# Patient Record
Sex: MALE
[De-identification: names, ages, dates, MRNs, and addresses within clinical notes are randomized per-mention and may not be internally consistent; named-entity substitution may affect disease eponyms.]

---

## 2017-09-22 PROCEDURE — 84403 ASSAY OF TOTAL TESTOSTERONE: CPT | Performed by: INTERNAL MEDICINE

## 2017-10-16 PROCEDURE — 81003 URINALYSIS AUTO W/O SCOPE: CPT | Performed by: INTERNAL MEDICINE

## 2017-11-16 ENCOUNTER — HOSPITAL (OUTPATIENT)
Dept: OTHER | Age: 47
End: 2017-11-16
Attending: EMERGENCY MEDICINE

## 2017-11-16 PROBLEM — T39.391A NSAID OVERDOSE: Status: ACTIVE | Noted: 2017-11-16

## 2017-11-16 PROBLEM — R79.89 ELEVATED SERUM CREATININE: Status: ACTIVE | Noted: 2017-11-16

## 2017-11-16 PROBLEM — T50.905A: Status: ACTIVE | Noted: 2017-11-16

## 2017-11-16 PROBLEM — N18.2 CKD (CHRONIC KIDNEY DISEASE) STAGE 2, GFR 60-89 ML/MIN: Status: ACTIVE | Noted: 2017-11-16

## 2018-03-31 PROCEDURE — 84403 ASSAY OF TOTAL TESTOSTERONE: CPT | Performed by: INTERNAL MEDICINE

## 2018-03-31 PROCEDURE — 84402 ASSAY OF FREE TESTOSTERONE: CPT | Performed by: INTERNAL MEDICINE

## 2018-04-17 PROCEDURE — 82570 ASSAY OF URINE CREATININE: CPT | Performed by: INTERNAL MEDICINE

## 2018-04-17 PROCEDURE — 84156 ASSAY OF PROTEIN URINE: CPT | Performed by: INTERNAL MEDICINE

## 2018-04-17 PROCEDURE — 82610 CYSTATIN C: CPT | Performed by: INTERNAL MEDICINE

## 2018-08-20 PROBLEM — M77.11 RIGHT TENNIS ELBOW: Status: ACTIVE | Noted: 2018-08-20

## 2018-10-22 PROCEDURE — 82024 ASSAY OF ACTH: CPT | Performed by: INTERNAL MEDICINE

## 2018-10-22 PROCEDURE — 84305 ASSAY OF SOMATOMEDIN: CPT | Performed by: INTERNAL MEDICINE

## 2019-04-05 PROCEDURE — 88305 TISSUE EXAM BY PATHOLOGIST: CPT | Performed by: INTERNAL MEDICINE

## 2019-06-11 PROBLEM — M77.8 TENDINITIS OF RIGHT TRICEPS: Status: ACTIVE | Noted: 2019-06-11

## 2019-06-11 PROBLEM — M77.11 LATERAL EPICONDYLITIS, RIGHT ELBOW: Status: ACTIVE | Noted: 2019-06-11

## 2019-06-11 PROBLEM — M25.562 PATELLOFEMORAL JOINT PAIN, LEFT: Status: ACTIVE | Noted: 2019-06-11

## 2019-06-28 PROCEDURE — 84402 ASSAY OF FREE TESTOSTERONE: CPT | Performed by: INTERNAL MEDICINE

## 2019-06-28 PROCEDURE — 84403 ASSAY OF TOTAL TESTOSTERONE: CPT | Performed by: INTERNAL MEDICINE

## 2019-07-10 PROBLEM — T39.391A NSAID OVERDOSE: Status: RESOLVED | Noted: 2017-11-16 | Resolved: 2019-07-10

## 2019-07-10 PROBLEM — T50.905A: Status: RESOLVED | Noted: 2017-11-16 | Resolved: 2019-07-10

## 2019-07-10 PROBLEM — N18.2 CKD (CHRONIC KIDNEY DISEASE) STAGE 2, GFR 60-89 ML/MIN: Status: RESOLVED | Noted: 2017-11-16 | Resolved: 2019-07-10

## 2021-11-02 ENCOUNTER — TELEPHONE (OUTPATIENT)
Dept: SCHEDULING | Age: 51
End: 2021-11-02

## 2022-03-14 PROBLEM — M77.11 RIGHT TENNIS ELBOW: Status: RESOLVED | Noted: 2018-08-20 | Resolved: 2022-03-14

## 2022-03-14 PROBLEM — M77.11 LATERAL EPICONDYLITIS, RIGHT ELBOW: Status: RESOLVED | Noted: 2019-06-11 | Resolved: 2022-03-14

## 2022-03-14 PROBLEM — M77.8 TENDINITIS OF RIGHT TRICEPS: Status: RESOLVED | Noted: 2019-06-11 | Resolved: 2022-03-14

## 2022-03-14 PROBLEM — M25.562 PATELLOFEMORAL JOINT PAIN, LEFT: Status: RESOLVED | Noted: 2019-06-11 | Resolved: 2022-03-14

## 2023-04-09 NOTE — OPERATIVE REPORT
PATIENT NAME: Genevieve Mejia  MRN: [id number]  DATE OF OPERATION: 4/11/2023  REFERRING PHYSICIAN: Dr. Soledad Betancourt  Medications:  Versed 16 mg IVP              Fentanyl 100 mcg IVP  Benadryl 50 mg IVP  Date of last COLONOSCOPY:  2019  PREOPERATIVE DIAGNOSIS  family hx of colon cancer (Mother at 54years of age)  family hx of colon polyps (Father had polyps in his 46s)  Chronic ulcerative colitis  POSTOPERATIVE DIAGNOSIS:  1. Normal colon exam.  Random colon biopsies were taken to assess for dysplasia  2.  enlarged internal hemorrhoids. PROCEDURE PERFORMED:               Colonoscopy with biopsies  Endoscopist:                                             Winifred Clements MD     PROCEDURE AND FINDINGS: The patient was placed into the left lateral decubitus after informed consent was obtained. All questions were answered. An updated history and physical were performed and an ASA score of 2 was assigned. Informed consent was obtained prior to the procedure, with review of possible complications including bleeding, infection, and missed polyps and or cancer. IV sedation was administered. A digital rectal exam was performed and was normal.  The video pediatric colonoscope was passed from anus to cecum. The ileocecal valve, appendiceal orfice, hepatic and splenic flexures were all well visualized. The bowel preparation was rated on the Aronchick bowel prep scale as 2. (1 - excellent > 95% mucosa seen; 2 - good - clear liquid up to 25% of the mucosa, 90% mucosa seen;  3 - fair - semisolid stool not suctioned, but 90% of the mucosa seen; 4 - poor - semisolid stool not suctioned, but < 90% mucosa seen; 5 - inadequate - repeat prep needed) . Findings included no polyps and normal appearing mucosa. Biopsies were done to assess for dysplasia. On retroflexion of the scope in the anorectum, enlarged internal hemorrhoids were noted. The scope withdrawal from cecum to anus was 10 minutes.    A trained sedation nurse was present to assist in monitoring the patient during the entire length of moderate sedation time. Total moderate sedation time was 18 minutes. RECOMMENDATIONS         1. The patient will be provided with a written summary of today's endoscopic findings. 2. The patient will be notified with biopsy results in 2 - 4 working days. 3. Repeat colonoscopy in 3 - 4 years. 4.  Referral given to general surgery for hemorrhoidal banding.         Lesvia Chang MD, Gastroenterologist  Cc: Dr. Isaura Cutler

## 2023-04-11 ENCOUNTER — HOSPITAL ENCOUNTER (OUTPATIENT)
Facility: HOSPITAL | Age: 53
Setting detail: HOSPITAL OUTPATIENT SURGERY
Discharge: HOME | End: 2023-04-11
Attending: INTERNAL MEDICINE | Admitting: INTERNAL MEDICINE
Payer: COMMERCIAL

## 2023-04-11 VITALS
BODY MASS INDEX: 31.44 KG/M2 | HEART RATE: 71 BPM | WEIGHT: 245 LBS | TEMPERATURE: 99 F | SYSTOLIC BLOOD PRESSURE: 122 MMHG | RESPIRATION RATE: 16 BRPM | OXYGEN SATURATION: 97 % | DIASTOLIC BLOOD PRESSURE: 85 MMHG | HEIGHT: 74 IN

## 2023-04-11 PROCEDURE — 88305 TISSUE EXAM BY PATHOLOGIST: CPT | Performed by: INTERNAL MEDICINE

## 2023-04-11 PROCEDURE — 99152 MOD SED SAME PHYS/QHP 5/>YRS: CPT | Performed by: INTERNAL MEDICINE

## 2023-04-11 PROCEDURE — 99153 MOD SED SAME PHYS/QHP EA: CPT | Performed by: INTERNAL MEDICINE

## 2023-04-11 PROCEDURE — 0DBF8ZX EXCISION OF RIGHT LARGE INTESTINE, VIA NATURAL OR ARTIFICIAL OPENING ENDOSCOPIC, DIAGNOSTIC: ICD-10-PCS | Performed by: INTERNAL MEDICINE

## 2023-04-11 PROCEDURE — 0DBG8ZX EXCISION OF LEFT LARGE INTESTINE, VIA NATURAL OR ARTIFICIAL OPENING ENDOSCOPIC, DIAGNOSTIC: ICD-10-PCS | Performed by: INTERNAL MEDICINE

## 2023-04-11 RX ORDER — MIDAZOLAM HYDROCHLORIDE 1 MG/ML
INJECTION INTRAMUSCULAR; INTRAVENOUS
Status: DISCONTINUED | OUTPATIENT
Start: 2023-04-11 | End: 2023-04-11

## 2023-04-11 RX ORDER — SODIUM CHLORIDE, SODIUM LACTATE, POTASSIUM CHLORIDE, CALCIUM CHLORIDE 600; 310; 30; 20 MG/100ML; MG/100ML; MG/100ML; MG/100ML
INJECTION, SOLUTION INTRAVENOUS CONTINUOUS
Status: DISCONTINUED | OUTPATIENT
Start: 2023-04-11 | End: 2023-04-11

## 2023-04-11 RX ORDER — DIPHENHYDRAMINE HYDROCHLORIDE 50 MG/ML
INJECTION INTRAMUSCULAR; INTRAVENOUS
Status: DISCONTINUED | OUTPATIENT
Start: 2023-04-11 | End: 2023-04-11

## 2023-04-11 NOTE — BRIEF OP NOTE
Pre-Operative Diagnosis: SPECIAL SCREENING FOR MALIGNANT NEOPLASMS COLON, FAMILY HISTORY OF MALIGNANT NEOPLASM OF GASTROINTESTINAL TRACT, HISTORY OF COLON POLYPS     Post-Operative Diagnosis: Normal       Procedure Performed:   COLONOSCOPY    Surgeon(s) and Role:     * Mark Roy MD - Primary    Assistant(s):        Surgical Findings: see above     Specimen: colon     Estimated Blood Loss: No data recorded    Dictation Number:  none    Jeremy Langford MD  4/11/2023  10:59 AM

## 2023-04-11 NOTE — DISCHARGE INSTRUCTIONS
ENDOSCOPY DISCHARGE INSTRUCTIONS    Procedure Performed:   Colonoscopy  Endoscopist: No name on file  FINDINGS:   Internal/external hemorrhoids and Normal colon    MEDICATIONS:  You may resume all other medications today    DIET:  General    BIOPSIES:  Biopsies were taken (you will be notified of results in 7-10 days)      ADDITIONAL RECOMMENDATIONS:  Repeat colonoscopy in 3 - 4 years. Follow up with general surgery for evaluation for hemorrhoidal removal.    Activity for remainder of today:    REST TODAY  DO NOT drive or operate heavy machinery  DO NOT drink any alcoholic beverages  DO NOT sign any legal documents or make any important decisions    After your procedure(s): It is not unusual to feel bloated or gassy . Passing gas and belching is encouraged. Lying on your left side with your knees flexed may relieve the discomfort. A hot pack to the abdomen may also help. After your gastroscopy (upper endoscopy): You may experience a slight sore throat which will subside. Throat lozenges or salt water gargle can be used.     FOLLOW-UP:  Contact the office at [phone] for follow-up appointment if needed or if you develop any of the following:    Severe abdominal pain/discomfort       Excessive bleeding or black tarry stool  Difficulty breathing or swallowing        Persistent nausea,vomiting, or a fever above 100 degrees or chills

## (undated) DEVICE — 1200CC GUARDIAN II: Brand: GUARDIAN

## (undated) DEVICE — FORCEP BIOPSY RJ4 LG CAP W/ND

## (undated) DEVICE — KIT ENDO ORCAPOD 160/180/190

## (undated) DEVICE — 10FT COMBINED O2 DELIVERY/CO2 MONITORING. FILTER WITH MICROSTREAM TYPE LUER: Brand: DUAL ADULT NASAL CANNULA

## (undated) DEVICE — ENDOSCOPY PACK - LOWER: Brand: MEDLINE INDUSTRIES, INC.

## (undated) DEVICE — BIOGUARD CLEANING ADAPTER

## (undated) DEVICE — 3M™ RED DOT™ MONITORING ELECTRODE WITH FOAM TAPE AND STICKY GEL, 50/BAG, 20/CASE, 72/PLT 2570: Brand: RED DOT™